# Patient Record
Sex: MALE | URBAN - METROPOLITAN AREA
[De-identification: names, ages, dates, MRNs, and addresses within clinical notes are randomized per-mention and may not be internally consistent; named-entity substitution may affect disease eponyms.]

---

## 2017-07-24 ENCOUNTER — HISTORICAL (OUTPATIENT)
Dept: ENDOSCOPY | Facility: HOSPITAL | Age: 47
End: 2017-07-24

## 2022-04-10 ENCOUNTER — HISTORICAL (OUTPATIENT)
Dept: ADMINISTRATIVE | Facility: HOSPITAL | Age: 52
End: 2022-04-10

## 2022-04-25 VITALS
BODY MASS INDEX: 25.44 KG/M2 | DIASTOLIC BLOOD PRESSURE: 82 MMHG | HEIGHT: 67 IN | SYSTOLIC BLOOD PRESSURE: 129 MMHG | OXYGEN SATURATION: 97 % | WEIGHT: 162.06 LBS

## 2022-04-29 NOTE — OP NOTE
DATE OF SURGERY:    07/24/2017    SURGEON:  Yimi Vo MD    ATTENDING PHYSICIAN:  Dr. Yimi Vo MD    ATTENDING SURGEON:  Yimi Vo MD.    RESIDENT SURGEON:  Evelyn Godfrey MD    PROCEDURE PERFORMED:  Colonoscopy.    COMPLICATIONS:  None.    BLOOD LOSS:  0 cc.    FINDINGS:  Normal colonic mucosa, external hemorrhoids.    INDICATION FOR PROCEDURE:  The patient is a 47-year-old male who states he has noted blood in his stool for approximately 15 years.  He was referred today for colonoscopy.    PROCEDURE IN DETAIL:  Risks and benefits were discussed in the preop holding area and the patient consented to proceed with colonoscopy. The patient was then taken to the GI suite and placed in the left lateral decubitus position.  MAC anesthesia was induced by the anesthesia team.  A digital rectal exam was performed which showed external hemorrhoids.  A lubricated colonoscope was then inserted into the patient's rectum and advanced under direct visualization to the cecum.  The cecum was identified visually and with anatomic landmarks.  A photograph was obtained of the appendiceal orifice.  The scope was then retracted allowing for visualization of the entire colonic mucosa.  No abnormalities were noted from the cecum to the rectum with regard to color, texture or presence of nodularity or pathologic lesions such as polyps or cancers.  In the rectum the scope was retroflexed to allow visualization of the anal verge.  Again no pathology was noted other than the     external hemorrhoids.  The scope was then retracted through the patient's rectum and the procedure terminated.  The patient was transferred to recovery room in stable condition.        ______________________________  Evelyn Godfrey MD    ______________________________  Yimi Vo MD    AM/MODL  DD:  07/24/2017  Time:  12:09PM  DT:  07/24/2017  Time:  12:41PM  Job #:  [id number]